# Patient Record
Sex: MALE | Race: BLACK OR AFRICAN AMERICAN | NOT HISPANIC OR LATINO | Employment: UNEMPLOYED
[De-identification: names, ages, dates, MRNs, and addresses within clinical notes are randomized per-mention and may not be internally consistent; named-entity substitution may affect disease eponyms.]

---

## 2019-01-09 ENCOUNTER — HOSPITAL (OUTPATIENT)
Dept: OTHER | Age: 49
End: 2019-01-09

## 2019-01-09 ENCOUNTER — HOSPITAL (OUTPATIENT)
Dept: OTHER | Age: 49
End: 2019-01-09
Attending: INTERNAL MEDICINE

## 2019-01-09 ENCOUNTER — DIAGNOSTIC TRANS (OUTPATIENT)
Dept: OTHER | Age: 49
End: 2019-01-09

## 2019-01-09 LAB
ANALYZER ANC (IANC): ABNORMAL
ANION GAP SERPL CALC-SCNC: 15 MMOL/L (ref 10–20)
APTT PPP: 27 SECONDS (ref 22–32)
APTT PPP: NORMAL S
BASOPHILS # BLD: 0 THOUSAND/MCL (ref 0–0.3)
BASOPHILS NFR BLD: 0 %
BUN SERPL-MCNC: 16 MG/DL (ref 6–20)
BUN/CREAT SERPL: 21 (ref 7–25)
CALCIUM SERPL-MCNC: 8.7 MG/DL (ref 8.4–10.2)
CHLORIDE: 99 MMOL/L (ref 98–107)
CO2 SERPL-SCNC: 26 MMOL/L (ref 21–32)
CREAT SERPL-MCNC: 0.78 MG/DL (ref 0.67–1.17)
DIFFERENTIAL METHOD BLD: ABNORMAL
EOSINOPHIL # BLD: 0 THOUSAND/MCL (ref 0.1–0.5)
EOSINOPHIL NFR BLD: 0 %
ERYTHROCYTE [DISTWIDTH] IN BLOOD: 12.1 % (ref 11–15)
GLUCOSE SERPL-MCNC: 138 MG/DL (ref 65–99)
HEMATOCRIT: 39.4 % (ref 39–51)
HGB BLD-MCNC: 14 GM/DL (ref 13–17)
IMM GRANULOCYTES # BLD AUTO: 0.1 THOUSAND/MCL (ref 0–0.2)
IMM GRANULOCYTES NFR BLD: 1 %
INR PPP: 1.1
LYMPHOCYTES # BLD: 1.4 THOUSAND/MCL (ref 1–4.8)
LYMPHOCYTES NFR BLD: 13 %
MCH RBC QN AUTO: 30 PG (ref 26–34)
MCHC RBC AUTO-ENTMCNC: 35.5 GM/DL (ref 32–36.5)
MCV RBC AUTO: 84.5 FL (ref 78–100)
MONOCYTES # BLD: 0.5 THOUSAND/MCL (ref 0.3–0.9)
MONOCYTES NFR BLD: 5 %
NEUTROPHILS # BLD: 8.5 THOUSAND/MCL (ref 1.8–7.7)
NEUTROPHILS NFR BLD: 81 %
NEUTS SEG NFR BLD: ABNORMAL %
NRBC (NRBCRE): 0 /100 WBC
PLATELET # BLD: 285 THOUSAND/MCL (ref 140–450)
POTASSIUM SERPL-SCNC: 3.6 MMOL/L (ref 3.4–5.1)
PROTHROMBIN TIME: 10.8 SECONDS (ref 9.7–11.8)
PROTHROMBIN TIME: NORMAL
RBC # BLD: 4.66 MILLION/MCL (ref 4.5–5.9)
SODIUM SERPL-SCNC: 136 MMOL/L (ref 135–145)
TROPONIN I SERPL HS-MCNC: 0.67 NG/ML
WBC # BLD: 10.5 THOUSAND/MCL (ref 4.2–11)

## 2019-01-10 LAB
CREAT SERPL-MCNC: 0.94 MG/DL (ref 0.67–1.17)
POTASSIUM SERPL-SCNC: 4.5 MMOL/L (ref 3.4–5.1)
TROPONIN I SERPL HS-MCNC: 27.71 NG/ML

## 2019-01-13 PROBLEM — Z09 HOSPITAL DISCHARGE FOLLOW-UP: Status: ACTIVE | Noted: 2019-01-13

## 2019-01-14 PROBLEM — E78.2 MIXED HYPERLIPIDEMIA: Status: ACTIVE | Noted: 2019-01-14

## 2019-01-14 PROBLEM — Z95.5 S/P DRUG ELUTING CORONARY STENT PLACEMENT: Status: ACTIVE | Noted: 2019-01-14

## 2019-01-14 PROBLEM — I10 ESSENTIAL HYPERTENSION: Status: ACTIVE | Noted: 2019-01-14

## 2019-01-14 PROBLEM — I25.10 CORONARY ARTERY DISEASE INVOLVING NATIVE CORONARY ARTERY OF NATIVE HEART WITHOUT ANGINA PECTORIS: Status: ACTIVE | Noted: 2019-01-14

## 2019-01-23 ENCOUNTER — HOSPITAL (OUTPATIENT)
Dept: OTHER | Age: 49
End: 2019-01-23
Attending: INTERNAL MEDICINE

## 2019-02-01 ENCOUNTER — HOSPITAL (OUTPATIENT)
Dept: OTHER | Age: 49
End: 2019-02-01
Attending: INTERNAL MEDICINE

## 2019-03-01 ENCOUNTER — HOSPITAL (OUTPATIENT)
Dept: OTHER | Age: 49
End: 2019-03-01
Attending: INTERNAL MEDICINE

## 2019-04-01 ENCOUNTER — HOSPITAL (OUTPATIENT)
Dept: OTHER | Age: 49
End: 2019-04-01
Attending: INTERNAL MEDICINE

## 2019-05-01 ENCOUNTER — HOSPITAL (OUTPATIENT)
Dept: OTHER | Age: 49
End: 2019-05-01
Attending: INTERNAL MEDICINE

## 2019-06-01 ENCOUNTER — HOSPITAL (OUTPATIENT)
Dept: OTHER | Age: 49
End: 2019-06-01
Attending: INTERNAL MEDICINE

## 2021-12-02 PROBLEM — R53.83 FATIGUE, UNSPECIFIED TYPE: Status: ACTIVE | Noted: 2021-12-02

## 2021-12-02 PROBLEM — R94.31 ABNORMAL EKG: Status: ACTIVE | Noted: 2021-12-02

## 2022-09-26 PROCEDURE — 80053 COMPREHEN METABOLIC PANEL: CPT | Performed by: EMERGENCY MEDICINE

## 2022-09-26 PROCEDURE — 99285 EMERGENCY DEPT VISIT HI MDM: CPT

## 2022-09-26 PROCEDURE — 85379 FIBRIN DEGRADATION QUANT: CPT | Performed by: EMERGENCY MEDICINE

## 2022-09-26 PROCEDURE — 84484 ASSAY OF TROPONIN QUANT: CPT | Performed by: EMERGENCY MEDICINE

## 2022-09-26 PROCEDURE — 36415 COLL VENOUS BLD VENIPUNCTURE: CPT

## 2022-09-26 PROCEDURE — 85730 THROMBOPLASTIN TIME PARTIAL: CPT | Performed by: EMERGENCY MEDICINE

## 2022-09-26 PROCEDURE — 93005 ELECTROCARDIOGRAM TRACING: CPT | Performed by: EMERGENCY MEDICINE

## 2022-09-26 PROCEDURE — 85025 COMPLETE CBC W/AUTO DIFF WBC: CPT | Performed by: EMERGENCY MEDICINE

## 2022-09-26 PROCEDURE — 85610 PROTHROMBIN TIME: CPT | Performed by: EMERGENCY MEDICINE

## 2022-09-26 ASSESSMENT — PAIN SCALES - GENERAL: PAINLEVEL_OUTOF10: 3

## 2022-09-27 ENCOUNTER — HOSPITAL ENCOUNTER (INPATIENT)
Age: 52
LOS: 2 days | Discharge: HOME | DRG: 282 | End: 2022-09-29
Attending: EMERGENCY MEDICINE | Admitting: HOSPITALIST

## 2022-09-27 ENCOUNTER — APPOINTMENT (OUTPATIENT)
Dept: GENERAL RADIOLOGY | Age: 52
DRG: 282 | End: 2022-09-27
Attending: EMERGENCY MEDICINE

## 2022-09-27 DIAGNOSIS — I21.4 NSTEMI (NON-ST ELEVATED MYOCARDIAL INFARCTION) (CMD): ICD-10-CM

## 2022-09-27 DIAGNOSIS — I20.0 UNSTABLE ANGINA (CMD): Primary | ICD-10-CM

## 2022-09-27 DIAGNOSIS — R79.89 ELEVATED TROPONIN I LEVEL: ICD-10-CM

## 2022-09-27 LAB
ALBUMIN SERPL-MCNC: 3.8 G/DL (ref 3.6–5.1)
ALBUMIN/GLOB SERPL: 0.9 {RATIO} (ref 1–2.4)
ALP SERPL-CCNC: 69 UNITS/L (ref 45–117)
ALT SERPL-CCNC: 30 UNITS/L
ANION GAP SERPL CALC-SCNC: 9 MMOL/L (ref 7–19)
APTT PPP: 24 SEC (ref 22–30)
APTT PPP: 40 SEC (ref 22–30)
APTT PPP: 44 SEC (ref 22–30)
APTT PPP: 47 SEC (ref 22–30)
AST SERPL-CCNC: 24 UNITS/L
ATRIAL RATE (BPM): 83
ATRIAL RATE (BPM): 83
BASOPHILS # BLD: 0 K/MCL (ref 0–0.3)
BASOPHILS NFR BLD: 0 %
BILIRUB SERPL-MCNC: 0.7 MG/DL (ref 0.2–1)
BUN SERPL-MCNC: 17 MG/DL (ref 6–20)
BUN/CREAT SERPL: 22 (ref 7–25)
CALCIUM SERPL-MCNC: 8.6 MG/DL (ref 8.4–10.2)
CHLORIDE SERPL-SCNC: 101 MMOL/L (ref 97–110)
CHOLEST SERPL-MCNC: 121 MG/DL
CHOLEST/HDLC SERPL: 4.8 {RATIO}
CO2 SERPL-SCNC: 28 MMOL/L (ref 21–32)
CREAT SERPL-MCNC: 0.78 MG/DL (ref 0.67–1.17)
D DIMER PPP FEU-MCNC: 0.48 MG/L (FEU)
DEPRECATED RDW RBC: 36.8 FL (ref 39–50)
DEPRECATED RDW RBC: 37.1 FL (ref 39–50)
DEPRECATED RDW RBC: 37.2 FL (ref 39–50)
EOSINOPHIL # BLD: 0 K/MCL (ref 0–0.5)
EOSINOPHIL NFR BLD: 0 %
ERYTHROCYTE [DISTWIDTH] IN BLOOD: 12.2 % (ref 11–15)
FASTING DURATION TIME PATIENT: ABNORMAL H
FASTING DURATION TIME PATIENT: ABNORMAL H
GFR SERPLBLD BASED ON 1.73 SQ M-ARVRAT: >90 ML/MIN
GLOBULIN SER-MCNC: 4.3 G/DL (ref 2–4)
GLUCOSE SERPL-MCNC: 149 MG/DL (ref 70–99)
HBA1C MFR BLD: 5.4 % (ref 4.5–5.6)
HCT VFR BLD CALC: 40.7 % (ref 39–51)
HCT VFR BLD CALC: 43.6 % (ref 39–51)
HCT VFR BLD CALC: 44.4 % (ref 39–51)
HDLC SERPL-MCNC: 25 MG/DL
HGB BLD-MCNC: 13.7 G/DL (ref 13–17)
HGB BLD-MCNC: 14.8 G/DL (ref 13–17)
HGB BLD-MCNC: 15.3 G/DL (ref 13–17)
IMM GRANULOCYTES # BLD AUTO: 0 K/MCL (ref 0–0.2)
IMM GRANULOCYTES # BLD: 1 %
INR PPP: 1.1
LDLC SERPL CALC-MCNC: 68 MG/DL
LYMPHOCYTES # BLD: 1.2 K/MCL (ref 1–4)
LYMPHOCYTES NFR BLD: 15 %
MCH RBC QN AUTO: 28.1 PG (ref 26–34)
MCH RBC QN AUTO: 28.2 PG (ref 26–34)
MCH RBC QN AUTO: 28.6 PG (ref 26–34)
MCHC RBC AUTO-ENTMCNC: 33.7 G/DL (ref 32–36.5)
MCHC RBC AUTO-ENTMCNC: 33.9 G/DL (ref 32–36.5)
MCHC RBC AUTO-ENTMCNC: 34.5 G/DL (ref 32–36.5)
MCV RBC AUTO: 83 FL (ref 78–100)
MCV RBC AUTO: 83.2 FL (ref 78–100)
MCV RBC AUTO: 83.4 FL (ref 78–100)
MONOCYTES # BLD: 0.5 K/MCL (ref 0.3–0.9)
MONOCYTES NFR BLD: 6 %
NEUTROPHILS # BLD: 6.4 K/MCL (ref 1.8–7.7)
NEUTROPHILS NFR BLD: 78 %
NONHDLC SERPL-MCNC: 96 MG/DL
NRBC BLD MANUAL-RTO: 0 /100 WBC
P AXIS (DEGREES): 23
P AXIS (DEGREES): 40
PLATELET # BLD AUTO: 211 K/MCL (ref 140–450)
PLATELET # BLD AUTO: 214 K/MCL (ref 140–450)
PLATELET # BLD AUTO: 227 K/MCL (ref 140–450)
POTASSIUM SERPL-SCNC: 3.3 MMOL/L (ref 3.4–5.1)
PR-INTERVAL (MSEC): 146
PR-INTERVAL (MSEC): 146
PROT SERPL-MCNC: 8.1 G/DL (ref 6.4–8.2)
PROTHROMBIN TIME: 11.2 SEC (ref 9.7–11.8)
QRS-INTERVAL (MSEC): 82
QRS-INTERVAL (MSEC): 90
QT-INTERVAL (MSEC): 382
QT-INTERVAL (MSEC): 390
QTC: 449
QTC: 458
R AXIS (DEGREES): 14
R AXIS (DEGREES): 38
RAINBOW EXTRA TUBES HOLD SPECIMEN: NORMAL
RBC # BLD: 4.88 MIL/MCL (ref 4.5–5.9)
RBC # BLD: 5.24 MIL/MCL (ref 4.5–5.9)
RBC # BLD: 5.35 MIL/MCL (ref 4.5–5.9)
REPORT TEXT: NORMAL
REPORT TEXT: NORMAL
SARS-COV-2 N GENE CT SPEC QN NAA N2: 40.6
SARS-COV-2 RNA RESP QL NAA+PROBE: DETECTED
SERVICE CMNT-IMP: ABNORMAL
SODIUM SERPL-SCNC: 135 MMOL/L (ref 135–145)
T AXIS (DEGREES): 24
T AXIS (DEGREES): 39
TRIGL SERPL-MCNC: 141 MG/DL
TROPONIN I SERPL DL<=0.01 NG/ML-MCNC: 3672 NG/L
TROPONIN I SERPL DL<=0.01 NG/ML-MCNC: 62 NG/L
VENTRICULAR RATE EKG/MIN (BPM): 83
VENTRICULAR RATE EKG/MIN (BPM): 83
WBC # BLD: 5.6 K/MCL (ref 4.2–11)
WBC # BLD: 7.1 K/MCL (ref 4.2–11)
WBC # BLD: 8.2 K/MCL (ref 4.2–11)

## 2022-09-27 PROCEDURE — 10002803 HB RX 637: Performed by: INTERNAL MEDICINE

## 2022-09-27 PROCEDURE — 10004651 HB RX, NO CHARGE ITEM: Performed by: EMERGENCY MEDICINE

## 2022-09-27 PROCEDURE — 36415 COLL VENOUS BLD VENIPUNCTURE: CPT | Performed by: EMERGENCY MEDICINE

## 2022-09-27 PROCEDURE — 83036 HEMOGLOBIN GLYCOSYLATED A1C: CPT | Performed by: HOSPITALIST

## 2022-09-27 PROCEDURE — 10004651 HB RX, NO CHARGE ITEM: Performed by: HOSPITALIST

## 2022-09-27 PROCEDURE — 85730 THROMBOPLASTIN TIME PARTIAL: CPT | Performed by: EMERGENCY MEDICINE

## 2022-09-27 PROCEDURE — 10002803 HB RX 637: Performed by: EMERGENCY MEDICINE

## 2022-09-27 PROCEDURE — 80061 LIPID PANEL: CPT | Performed by: HOSPITALIST

## 2022-09-27 PROCEDURE — 85027 COMPLETE CBC AUTOMATED: CPT | Performed by: EMERGENCY MEDICINE

## 2022-09-27 PROCEDURE — 87635 SARS-COV-2 COVID-19 AMP PRB: CPT | Performed by: INTERNAL MEDICINE

## 2022-09-27 PROCEDURE — 10003585 HB ROOM CHARGE INTERMEDIATE CARE

## 2022-09-27 PROCEDURE — 93005 ELECTROCARDIOGRAM TRACING: CPT | Performed by: EMERGENCY MEDICINE

## 2022-09-27 PROCEDURE — 71045 X-RAY EXAM CHEST 1 VIEW: CPT

## 2022-09-27 PROCEDURE — 85730 THROMBOPLASTIN TIME PARTIAL: CPT | Performed by: INTERNAL MEDICINE

## 2022-09-27 PROCEDURE — 84484 ASSAY OF TROPONIN QUANT: CPT | Performed by: EMERGENCY MEDICINE

## 2022-09-27 PROCEDURE — 10002800 HB RX 250 W HCPCS: Performed by: EMERGENCY MEDICINE

## 2022-09-27 RX ORDER — AMOXICILLIN 250 MG
2 CAPSULE ORAL DAILY PRN
Status: DISCONTINUED | OUTPATIENT
Start: 2022-09-27 | End: 2022-09-29 | Stop reason: HOSPADM

## 2022-09-27 RX ORDER — AMLODIPINE BESYLATE 5 MG/1
5 TABLET ORAL DAILY
COMMUNITY

## 2022-09-27 RX ORDER — CLONIDINE HYDROCHLORIDE 0.1 MG/1
0.1 TABLET ORAL EVERY 4 HOURS PRN
Status: ACTIVE | OUTPATIENT
Start: 2022-09-27 | End: 2022-09-28

## 2022-09-27 RX ORDER — POTASSIUM CHLORIDE 20 MEQ/1
40 TABLET, EXTENDED RELEASE ORAL ONCE
Status: COMPLETED | OUTPATIENT
Start: 2022-09-27 | End: 2022-09-27

## 2022-09-27 RX ORDER — ACETAMINOPHEN 325 MG/1
650 TABLET ORAL EVERY 4 HOURS PRN
Status: DISCONTINUED | OUTPATIENT
Start: 2022-09-27 | End: 2022-09-29 | Stop reason: HOSPADM

## 2022-09-27 RX ORDER — AMLODIPINE BESYLATE 5 MG/1
5 TABLET ORAL DAILY
Status: DISCONTINUED | OUTPATIENT
Start: 2022-09-27 | End: 2022-09-29 | Stop reason: HOSPADM

## 2022-09-27 RX ORDER — BISACODYL 10 MG
10 SUPPOSITORY, RECTAL RECTAL DAILY PRN
Status: DISCONTINUED | OUTPATIENT
Start: 2022-09-27 | End: 2022-09-29 | Stop reason: HOSPADM

## 2022-09-27 RX ORDER — HYDRALAZINE HYDROCHLORIDE 20 MG/ML
10 INJECTION INTRAMUSCULAR; INTRAVENOUS EVERY 4 HOURS PRN
Status: ACTIVE | OUTPATIENT
Start: 2022-09-27 | End: 2022-09-28

## 2022-09-27 RX ORDER — ASPIRIN 81 MG/1
324 TABLET, CHEWABLE ORAL ONCE
Status: COMPLETED | OUTPATIENT
Start: 2022-09-27 | End: 2022-09-27

## 2022-09-27 RX ORDER — HEPARIN SODIUM 10000 [USP'U]/100ML
1-30 INJECTION, SOLUTION INTRAVENOUS CONTINUOUS
Status: DISCONTINUED | OUTPATIENT
Start: 2022-09-27 | End: 2022-09-29 | Stop reason: HOSPADM

## 2022-09-27 RX ORDER — ARIPIPRAZOLE 5 MG/1
5 TABLET ORAL DAILY
Status: DISCONTINUED | OUTPATIENT
Start: 2022-09-27 | End: 2022-09-29 | Stop reason: HOSPADM

## 2022-09-27 RX ORDER — ASPIRIN 81 MG/1
81 TABLET, CHEWABLE ORAL DAILY
Status: DISCONTINUED | OUTPATIENT
Start: 2022-09-27 | End: 2022-09-29 | Stop reason: HOSPADM

## 2022-09-27 RX ORDER — HEPARIN SODIUM 1000 [USP'U]/ML
4000 INJECTION, SOLUTION INTRAVENOUS; SUBCUTANEOUS PRN
Status: DISCONTINUED | OUTPATIENT
Start: 2022-09-27 | End: 2022-09-29 | Stop reason: HOSPADM

## 2022-09-27 RX ORDER — POLYETHYLENE GLYCOL 3350 17 G/17G
17 POWDER, FOR SOLUTION ORAL DAILY PRN
Status: DISCONTINUED | OUTPATIENT
Start: 2022-09-27 | End: 2022-09-29 | Stop reason: HOSPADM

## 2022-09-27 RX ORDER — HEPARIN SODIUM 1000 [USP'U]/ML
4000 INJECTION, SOLUTION INTRAVENOUS; SUBCUTANEOUS ONCE
Status: COMPLETED | OUTPATIENT
Start: 2022-09-27 | End: 2022-09-27

## 2022-09-27 RX ORDER — ARIPIPRAZOLE 5 MG/1
5 TABLET ORAL DAILY
COMMUNITY

## 2022-09-27 RX ORDER — HEPARIN SODIUM 1000 [USP'U]/ML
2000 INJECTION, SOLUTION INTRAVENOUS; SUBCUTANEOUS PRN
Status: DISCONTINUED | OUTPATIENT
Start: 2022-09-27 | End: 2022-09-29 | Stop reason: HOSPADM

## 2022-09-27 RX ORDER — SODIUM CHLORIDE 9 MG/ML
INJECTION, SOLUTION INTRAVENOUS CONTINUOUS
Status: DISCONTINUED | OUTPATIENT
Start: 2022-09-27 | End: 2022-09-29 | Stop reason: HOSPADM

## 2022-09-27 RX ORDER — ROSUVASTATIN CALCIUM 20 MG/1
20 TABLET, COATED ORAL NIGHTLY
COMMUNITY

## 2022-09-27 RX ORDER — ONDANSETRON 2 MG/ML
4 INJECTION INTRAMUSCULAR; INTRAVENOUS 2 TIMES DAILY PRN
Status: DISCONTINUED | OUTPATIENT
Start: 2022-09-27 | End: 2022-09-29 | Stop reason: HOSPADM

## 2022-09-27 RX ORDER — 0.9 % SODIUM CHLORIDE 0.9 %
2 VIAL (ML) INJECTION EVERY 12 HOURS SCHEDULED
Status: DISCONTINUED | OUTPATIENT
Start: 2022-09-27 | End: 2022-09-29 | Stop reason: HOSPADM

## 2022-09-27 RX ORDER — ATORVASTATIN CALCIUM 20 MG/1
20 TABLET, FILM COATED ORAL NIGHTLY
Status: DISCONTINUED | OUTPATIENT
Start: 2022-09-27 | End: 2022-09-29 | Stop reason: HOSPADM

## 2022-09-27 RX ADMIN — HEPARIN SODIUM 14 UNITS/KG/HR: 10000 INJECTION, SOLUTION INTRAVENOUS at 09:57

## 2022-09-27 RX ADMIN — HEPARIN SODIUM 2000 UNITS: 1000 INJECTION, SOLUTION INTRAVENOUS; SUBCUTANEOUS at 23:55

## 2022-09-27 RX ADMIN — HEPARIN SODIUM 2000 UNITS: 1000 INJECTION, SOLUTION INTRAVENOUS; SUBCUTANEOUS at 09:57

## 2022-09-27 RX ADMIN — POTASSIUM CHLORIDE 40 MEQ: 1500 TABLET, EXTENDED RELEASE ORAL at 00:53

## 2022-09-27 RX ADMIN — ASPIRIN 81 MG CHEWABLE TABLET 81 MG: 81 TABLET CHEWABLE at 09:39

## 2022-09-27 RX ADMIN — HEPARIN SODIUM 4000 UNITS: 1000 INJECTION, SOLUTION INTRAVENOUS; SUBCUTANEOUS at 02:54

## 2022-09-27 RX ADMIN — METOPROLOL TARTRATE 25 MG: 25 TABLET, FILM COATED ORAL at 20:38

## 2022-09-27 RX ADMIN — ATORVASTATIN CALCIUM 20 MG: 20 TABLET, FILM COATED ORAL at 20:38

## 2022-09-27 RX ADMIN — HEPARIN SODIUM 16 UNITS/KG/HR: 10000 INJECTION, SOLUTION INTRAVENOUS at 23:53

## 2022-09-27 RX ADMIN — ASPIRIN 81 MG CHEWABLE TABLET 324 MG: 81 TABLET CHEWABLE at 00:40

## 2022-09-27 RX ADMIN — HEPARIN SODIUM 12 UNITS/KG/HR: 10000 INJECTION, SOLUTION INTRAVENOUS at 02:55

## 2022-09-27 RX ADMIN — ARIPIPRAZOLE 5 MG: 5 TABLET ORAL at 15:47

## 2022-09-27 RX ADMIN — AMLODIPINE BESYLATE 5 MG: 5 TABLET ORAL at 15:47

## 2022-09-27 ASSESSMENT — LIFESTYLE VARIABLES
ARE YOU BLIND OR DO YOU HAVE SERIOUS DIFFICULTY SEEING, EVEN WHEN WEARING GLASSES: NO
LAST DRINK YOU HAD: DENIES INTAKE
AUDIT-C TOTAL SCORE: 0
SMOKING_YEARS: NO
IS PATIENT ABLE TO COMPLETE ASSESSMENT AT THIS TIME: YES
HISTORY OF PROBLEMS WHEN YOU STOP DRINKING ALCOHOL: NO
ARE YOU DEAF OR DO YOU HAVE SERIOUS DIFFICULTY  HEARING: NO
SHORT OF BREATH OR FATIGUE WITH ADLS: NO
HAVE YOU BEEN EATING POORLY BECAUSE OF A DECREASED APPETITE: 0
ADL NEEDS ASSIST: NO
HOW OFTEN DO YOU HAVE A DRINK CONTAINING ALCOHOL: NEVER
CHRONIC/CANCER PAIN PRESENT: NO
RECENT DECLINE IN ADLS: NO
HOW OFTEN DO YOU HAVE 6 OR MORE DRINKS ON ONE OCCASION: NEVER
HOW MANY STANDARD DRINKS CONTAINING ALCOHOL DO YOU HAVE ON A TYPICAL DAY: 0,1 OR 2
ALCOHOL_USE_STATUS: NO OR LOW RISK WITH VALIDATED TOOL
RECENTLY LOST WEIGHT WITHOUT TRYING: 0
ADL BEFORE ADMISSION: INDEPENDENT

## 2022-09-27 ASSESSMENT — PAIN SCALES - GENERAL
PAINLEVEL_OUTOF10: 0

## 2022-09-27 ASSESSMENT — ACTIVITIES OF DAILY LIVING (ADL): ADL_SCORE: 12

## 2022-09-27 ASSESSMENT — HEART SCORE
TROPONIN: EQUAL OR LESS THAN NORMAL LIMIT
RISK FACTORS: EQUAL OR GREATER  THAN 3 RISK FACTORS OR HISTORY OF ATHEROSCLEROTIC DISEASE
HEART SCORE: 3
EKG: NORMAL
HISTORY: SLIGHTLY SUSPICIOUS
AGE: GREATER THAN 45 TO LESS THAN 65

## 2022-09-27 ASSESSMENT — COGNITIVE AND FUNCTIONAL STATUS - GENERAL
DO YOU HAVE DIFFICULTY DRESSING OR BATHING: NO
BECAUSE OF A PHYSICAL, MENTAL, OR EMOTIONAL CONDITION, DO YOU HAVE DIFFICULTY DOING ERRANDS ALONE: NO
DO YOU HAVE SERIOUS DIFFICULTY WALKING OR CLIMBING STAIRS: NO
BECAUSE OF A PHYSICAL, MENTAL, OR EMOTIONAL CONDITION, DO YOU HAVE SERIOUS DIFFICULTY CONCENTRATING, REMEMBERING OR MAKING DECISIONS: NO

## 2022-09-28 LAB
ANION GAP SERPL CALC-SCNC: 10 MMOL/L (ref 7–19)
APTT PPP: 63 SEC (ref 22–30)
APTT PPP: 63 SEC (ref 22–30)
BUN SERPL-MCNC: 13 MG/DL (ref 6–20)
BUN/CREAT SERPL: 20 (ref 7–25)
CALCIUM SERPL-MCNC: 9 MG/DL (ref 8.4–10.2)
CHLORIDE SERPL-SCNC: 103 MMOL/L (ref 97–110)
CO2 SERPL-SCNC: 27 MMOL/L (ref 21–32)
CREAT SERPL-MCNC: 0.66 MG/DL (ref 0.67–1.17)
DEPRECATED RDW RBC: 37.8 FL (ref 39–50)
ERYTHROCYTE [DISTWIDTH] IN BLOOD: 12.4 % (ref 11–15)
FASTING DURATION TIME PATIENT: ABNORMAL H
GFR SERPLBLD BASED ON 1.73 SQ M-ARVRAT: >90 ML/MIN
GLUCOSE SERPL-MCNC: 111 MG/DL (ref 70–99)
HCT VFR BLD CALC: 46.5 % (ref 39–51)
HGB BLD-MCNC: 15.9 G/DL (ref 13–17)
MCH RBC QN AUTO: 28.4 PG (ref 26–34)
MCHC RBC AUTO-ENTMCNC: 34.2 G/DL (ref 32–36.5)
MCV RBC AUTO: 83.2 FL (ref 78–100)
NRBC BLD MANUAL-RTO: 0 /100 WBC
PLATELET # BLD AUTO: 234 K/MCL (ref 140–450)
POTASSIUM SERPL-SCNC: 3.7 MMOL/L (ref 3.4–5.1)
RBC # BLD: 5.59 MIL/MCL (ref 4.5–5.9)
SODIUM SERPL-SCNC: 136 MMOL/L (ref 135–145)
WBC # BLD: 5.4 K/MCL (ref 4.2–11)

## 2022-09-28 PROCEDURE — C1887 CATHETER, GUIDING: HCPCS | Performed by: INTERNAL MEDICINE

## 2022-09-28 PROCEDURE — 10006023 HB SUPPLY 272: Performed by: INTERNAL MEDICINE

## 2022-09-28 PROCEDURE — 10002803 HB RX 637: Performed by: INTERNAL MEDICINE

## 2022-09-28 PROCEDURE — 4A033BC MEASUREMENT OF ARTERIAL PRESSURE, CORONARY, PERCUTANEOUS APPROACH: ICD-10-PCS | Performed by: INTERNAL MEDICINE

## 2022-09-28 PROCEDURE — 10002801 HB RX 250 W/O HCPCS: Performed by: INTERNAL MEDICINE

## 2022-09-28 PROCEDURE — 36415 COLL VENOUS BLD VENIPUNCTURE: CPT | Performed by: EMERGENCY MEDICINE

## 2022-09-28 PROCEDURE — C1769 GUIDE WIRE: HCPCS | Performed by: INTERNAL MEDICINE

## 2022-09-28 PROCEDURE — 10002807 HB RX 258: Performed by: PHYSICIAN ASSISTANT

## 2022-09-28 PROCEDURE — C1894 INTRO/SHEATH, NON-LASER: HCPCS | Performed by: INTERNAL MEDICINE

## 2022-09-28 PROCEDURE — 10002800 HB RX 250 W HCPCS: Performed by: INTERNAL MEDICINE

## 2022-09-28 PROCEDURE — 93571 IV DOP VEL&/PRESS C FLO 1ST: CPT | Performed by: INTERNAL MEDICINE

## 2022-09-28 PROCEDURE — 85027 COMPLETE CBC AUTOMATED: CPT | Performed by: EMERGENCY MEDICINE

## 2022-09-28 PROCEDURE — 80048 BASIC METABOLIC PNL TOTAL CA: CPT | Performed by: HOSPITALIST

## 2022-09-28 PROCEDURE — B2111ZZ FLUOROSCOPY OF MULTIPLE CORONARY ARTERIES USING LOW OSMOLAR CONTRAST: ICD-10-PCS | Performed by: INTERNAL MEDICINE

## 2022-09-28 PROCEDURE — 99152 MOD SED SAME PHYS/QHP 5/>YRS: CPT | Performed by: INTERNAL MEDICINE

## 2022-09-28 PROCEDURE — 10003585 HB ROOM CHARGE INTERMEDIATE CARE

## 2022-09-28 PROCEDURE — 85730 THROMBOPLASTIN TIME PARTIAL: CPT | Performed by: EMERGENCY MEDICINE

## 2022-09-28 PROCEDURE — 93458 L HRT ARTERY/VENTRICLE ANGIO: CPT | Performed by: INTERNAL MEDICINE

## 2022-09-28 PROCEDURE — 10004651 HB RX, NO CHARGE ITEM: Performed by: HOSPITALIST

## 2022-09-28 PROCEDURE — B2151ZZ FLUOROSCOPY OF LEFT HEART USING LOW OSMOLAR CONTRAST: ICD-10-PCS | Performed by: INTERNAL MEDICINE

## 2022-09-28 PROCEDURE — 4A023N7 MEASUREMENT OF CARDIAC SAMPLING AND PRESSURE, LEFT HEART, PERCUTANEOUS APPROACH: ICD-10-PCS | Performed by: INTERNAL MEDICINE

## 2022-09-28 PROCEDURE — 10002805 HB CONTRAST AGENT: Performed by: INTERNAL MEDICINE

## 2022-09-28 PROCEDURE — 99153 MOD SED SAME PHYS/QHP EA: CPT | Performed by: INTERNAL MEDICINE

## 2022-09-28 PROCEDURE — 85730 THROMBOPLASTIN TIME PARTIAL: CPT | Performed by: INTERNAL MEDICINE

## 2022-09-28 RX ORDER — MIDAZOLAM HYDROCHLORIDE 1 MG/ML
INJECTION, SOLUTION INTRAMUSCULAR; INTRAVENOUS PRN
Status: DISCONTINUED | OUTPATIENT
Start: 2022-09-28 | End: 2022-09-28 | Stop reason: HOSPADM

## 2022-09-28 RX ORDER — VERAPAMIL HYDROCHLORIDE 2.5 MG/ML
INJECTION, SOLUTION INTRAVENOUS PRN
Status: DISCONTINUED | OUTPATIENT
Start: 2022-09-28 | End: 2022-09-28 | Stop reason: HOSPADM

## 2022-09-28 RX ORDER — POTASSIUM CHLORIDE 20 MEQ/1
40 TABLET, EXTENDED RELEASE ORAL ONCE
Status: COMPLETED | OUTPATIENT
Start: 2022-09-28 | End: 2022-09-28

## 2022-09-28 RX ORDER — HEPARIN SODIUM 1000 [USP'U]/ML
INJECTION, SOLUTION INTRAVENOUS; SUBCUTANEOUS PRN
Status: DISCONTINUED | OUTPATIENT
Start: 2022-09-28 | End: 2022-09-28 | Stop reason: HOSPADM

## 2022-09-28 RX ORDER — LIDOCAINE HYDROCHLORIDE 20 MG/ML
INJECTION, SOLUTION EPIDURAL; INFILTRATION; INTRACAUDAL; PERINEURAL PRN
Status: DISCONTINUED | OUTPATIENT
Start: 2022-09-28 | End: 2022-09-28 | Stop reason: HOSPADM

## 2022-09-28 RX ADMIN — POTASSIUM CHLORIDE 40 MEQ: 1500 TABLET, EXTENDED RELEASE ORAL at 08:41

## 2022-09-28 RX ADMIN — ASPIRIN 81 MG CHEWABLE TABLET 81 MG: 81 TABLET CHEWABLE at 08:41

## 2022-09-28 RX ADMIN — METOPROLOL TARTRATE 25 MG: 25 TABLET, FILM COATED ORAL at 21:18

## 2022-09-28 RX ADMIN — ARIPIPRAZOLE 5 MG: 5 TABLET ORAL at 08:41

## 2022-09-28 RX ADMIN — ATORVASTATIN CALCIUM 20 MG: 20 TABLET, FILM COATED ORAL at 21:18

## 2022-09-28 RX ADMIN — SODIUM CHLORIDE 2 ML: 9 INJECTION, SOLUTION INTRAMUSCULAR; INTRAVENOUS; SUBCUTANEOUS at 20:00

## 2022-09-28 RX ADMIN — SODIUM CHLORIDE: 9 INJECTION, SOLUTION INTRAVENOUS at 13:53

## 2022-09-28 ASSESSMENT — PAIN SCALES - GENERAL
PAINLEVEL_OUTOF10: 0

## 2022-09-28 ASSESSMENT — COGNITIVE AND FUNCTIONAL STATUS - GENERAL
DO YOU HAVE SERIOUS DIFFICULTY WALKING OR CLIMBING STAIRS: NO
BECAUSE OF A PHYSICAL, MENTAL, OR EMOTIONAL CONDITION, DO YOU HAVE DIFFICULTY DOING ERRANDS ALONE: NO
BECAUSE OF A PHYSICAL, MENTAL, OR EMOTIONAL CONDITION, DO YOU HAVE SERIOUS DIFFICULTY CONCENTRATING, REMEMBERING OR MAKING DECISIONS: NO
DO YOU HAVE DIFFICULTY DRESSING OR BATHING: NO

## 2022-09-29 ENCOUNTER — APPOINTMENT (OUTPATIENT)
Dept: CARDIOLOGY | Age: 52
DRG: 282 | End: 2022-09-29
Attending: INTERNAL MEDICINE

## 2022-09-29 VITALS
TEMPERATURE: 98.2 F | BODY MASS INDEX: 27.13 KG/M2 | WEIGHT: 179.01 LBS | DIASTOLIC BLOOD PRESSURE: 77 MMHG | RESPIRATION RATE: 20 BRPM | HEIGHT: 68 IN | HEART RATE: 86 BPM | OXYGEN SATURATION: 98 % | SYSTOLIC BLOOD PRESSURE: 112 MMHG

## 2022-09-29 LAB
AORTIC VALVE AREA: NORMAL
APTT PPP: 26 SEC (ref 22–30)
ASCENDING AORTA (AAD): 3.1
AV MEAN GRADIENT (AVMG): 2
AV MEAN VELOCITY (AVMV): NORMAL
AV PEAK GRADIENT (AVPG): 3
AV PEAK VELOCITY (AVPV): NORMAL
AV STENOSIS SEVERITY TEXT: NORMAL
DEPRECATED RDW RBC: 37.5 FL (ref 39–50)
E WAVE DECELARATION TIME (MDT): 232
ERYTHROCYTE [DISTWIDTH] IN BLOOD: 12.4 % (ref 11–15)
EST RIGHT VENT SYSTOLIC PRESSURE BY TRICUSPID REGURGITATION JET (RVSP): 20.47
HCT VFR BLD CALC: 45.6 % (ref 39–51)
HGB BLD-MCNC: 15.3 G/DL (ref 13–17)
INTERVENTRICULAR SEPTUM IN END DIASTOLE (IVSD): 1
LEFT INTERNAL DIMENSION IN SYSTOLE (LVSD): 3.7
LEFT VENTRICULAR INTERNAL DIMENSION IN DIASTOLE (LVDD): 5.2
LEFT VENTRICULAR POSTERIOR WALL IN END DIASTOLE (LVPW): 1
LV EF: NORMAL %
LVOT 2D (LVOTD): 2.1
MCH RBC QN AUTO: 28.3 PG (ref 26–34)
MCHC RBC AUTO-ENTMCNC: 33.6 G/DL (ref 32–36.5)
MCV RBC AUTO: 84.3 FL (ref 78–100)
MV E TISSUE VEL LAT (MELV): 8.27
MV E TISSUE VEL MED (MESV): 6.42
MV E WAVE VEL/E TISSUE VEL MED(MSR): 8.15
MV PEAK A VELOCITY (MVPAV): 0.54
MV PEAK E VELOCITY (MVPEV): 0.52
NRBC BLD MANUAL-RTO: 0 /100 WBC
PLATELET # BLD AUTO: 247 K/MCL (ref 140–450)
POTASSIUM SERPL-SCNC: 3.6 MMOL/L (ref 3.4–5.1)
RBC # BLD: 5.41 MIL/MCL (ref 4.5–5.9)
TRICUSPID ANNULAR PLANE SYSTOLIC EXCURSION (TAPSE): 1.91
TRICUSPID VALVE PEAK REGURGITATION VELOCITY (TRPV): 2.09
TV ESTIMATED RIGHT ARTERIAL PRESSURE (RAP): 3
WBC # BLD: 5.7 K/MCL (ref 4.2–11)

## 2022-09-29 PROCEDURE — 10002803 HB RX 637: Performed by: INTERNAL MEDICINE

## 2022-09-29 PROCEDURE — 36415 COLL VENOUS BLD VENIPUNCTURE: CPT | Performed by: EMERGENCY MEDICINE

## 2022-09-29 PROCEDURE — 84132 ASSAY OF SERUM POTASSIUM: CPT | Performed by: INTERNAL MEDICINE

## 2022-09-29 PROCEDURE — 93306 TTE W/DOPPLER COMPLETE: CPT

## 2022-09-29 PROCEDURE — 10004651 HB RX, NO CHARGE ITEM: Performed by: HOSPITALIST

## 2022-09-29 PROCEDURE — 85027 COMPLETE CBC AUTOMATED: CPT | Performed by: EMERGENCY MEDICINE

## 2022-09-29 PROCEDURE — 85730 THROMBOPLASTIN TIME PARTIAL: CPT | Performed by: EMERGENCY MEDICINE

## 2022-09-29 RX ORDER — CLOPIDOGREL BISULFATE 75 MG/1
75 TABLET ORAL DAILY
Status: DISCONTINUED | OUTPATIENT
Start: 2022-09-29 | End: 2022-09-29 | Stop reason: HOSPADM

## 2022-09-29 RX ORDER — POTASSIUM CHLORIDE 20 MEQ/1
40 TABLET, EXTENDED RELEASE ORAL ONCE
Status: COMPLETED | OUTPATIENT
Start: 2022-09-29 | End: 2022-09-29

## 2022-09-29 RX ORDER — CLOPIDOGREL BISULFATE 75 MG/1
75 TABLET ORAL DAILY
Qty: 90 TABLET | Refills: 0 | Status: SHIPPED | OUTPATIENT
Start: 2022-09-29

## 2022-09-29 RX ORDER — CLOPIDOGREL BISULFATE 75 MG/1
75 TABLET ORAL DAILY
Qty: 90 TABLET | Refills: 0 | Status: SHIPPED | OUTPATIENT
Start: 2022-09-29 | End: 2022-09-29 | Stop reason: SDUPTHER

## 2022-09-29 RX ADMIN — SODIUM CHLORIDE 2 ML: 9 INJECTION, SOLUTION INTRAMUSCULAR; INTRAVENOUS; SUBCUTANEOUS at 10:02

## 2022-09-29 RX ADMIN — CLOPIDOGREL BISULFATE 75 MG: 75 TABLET, FILM COATED ORAL at 13:51

## 2022-09-29 RX ADMIN — ARIPIPRAZOLE 5 MG: 5 TABLET ORAL at 09:59

## 2022-09-29 RX ADMIN — METOPROLOL TARTRATE 25 MG: 25 TABLET, FILM COATED ORAL at 09:59

## 2022-09-29 RX ADMIN — POTASSIUM CHLORIDE 40 MEQ: 1500 TABLET, EXTENDED RELEASE ORAL at 09:59

## 2022-09-29 RX ADMIN — ASPIRIN 81 MG CHEWABLE TABLET 81 MG: 81 TABLET CHEWABLE at 09:59

## 2022-09-29 RX ADMIN — AMLODIPINE BESYLATE 5 MG: 5 TABLET ORAL at 09:59

## 2022-09-29 ASSESSMENT — PAIN SCALES - GENERAL: PAINLEVEL_OUTOF10: 0

## 2023-09-25 ENCOUNTER — LAB SERVICES (OUTPATIENT)
Dept: LAB | Age: 53
End: 2023-09-25

## 2023-09-25 ENCOUNTER — LAB ENCOUNTER (OUTPATIENT)
Dept: LAB | Facility: HOSPITAL | Age: 53
End: 2023-09-25
Attending: STUDENT IN AN ORGANIZED HEALTH CARE EDUCATION/TRAINING PROGRAM
Payer: COMMERCIAL

## 2023-09-25 ENCOUNTER — HOSPITAL ENCOUNTER (OUTPATIENT)
Dept: ULTRASOUND IMAGING | Age: 53
Discharge: HOME | End: 2023-09-25
Attending: STUDENT IN AN ORGANIZED HEALTH CARE EDUCATION/TRAINING PROGRAM

## 2023-09-25 ENCOUNTER — HOSPITAL ENCOUNTER (OUTPATIENT)
Dept: ULTRASOUND IMAGING | Facility: HOSPITAL | Age: 53
Discharge: HOME | End: 2023-09-25
Attending: STUDENT IN AN ORGANIZED HEALTH CARE EDUCATION/TRAINING PROGRAM
Payer: COMMERCIAL

## 2023-09-25 DIAGNOSIS — E78.2 MIXED HYPERLIPIDEMIA: ICD-10-CM

## 2023-09-25 DIAGNOSIS — I82.491 ACUTE THROMBOEMBOLISM OF PERONEAL VEIN, RIGHT (HCC): ICD-10-CM

## 2023-09-25 DIAGNOSIS — I82.453 ACUTE DEEP VEIN THROMBOSIS (DVT) OF BOTH PERONEAL VEINS (CMD): Primary | ICD-10-CM

## 2023-09-25 DIAGNOSIS — Z00.01 ENCOUNTER FOR GENERAL ADULT MEDICAL EXAMINATION WITH ABNORMAL FINDINGS: ICD-10-CM

## 2023-09-25 DIAGNOSIS — R73.03 BORDERLINE DIABETES: Primary | ICD-10-CM

## 2023-09-25 DIAGNOSIS — I82.453 ACUTE DEEP VEIN THROMBOSIS (DVT) OF BOTH PERONEAL VEINS (CMD): ICD-10-CM

## 2023-09-25 DIAGNOSIS — I82.409 DEEP VENOUS THROMBOSIS OF LOWER EXTREMITY (HCC): ICD-10-CM

## 2023-09-25 DIAGNOSIS — R73.03 BORDERLINE DIABETES: ICD-10-CM

## 2023-09-25 DIAGNOSIS — E03.9 ACQUIRED HYPOTHYROIDISM: ICD-10-CM

## 2023-09-25 LAB
ALBUMIN SERPL-MCNC: 3.5 G/DL (ref 3.4–5)
ALBUMIN/GLOB SERPL: 0.7 {RATIO} (ref 1–2)
ALP LIVER SERPL-CCNC: 91 U/L
ALT SERPL-CCNC: 68 U/L
ANION GAP SERPL CALC-SCNC: 6 MMOL/L (ref 0–18)
AST SERPL-CCNC: 45 U/L (ref 15–37)
BASOPHILS # BLD AUTO: 0.05 X10(3) UL (ref 0–0.2)
BASOPHILS NFR BLD AUTO: 0.6 %
BILIRUB SERPL-MCNC: 0.6 MG/DL (ref 0.1–2)
BUN BLD-MCNC: 13 MG/DL (ref 7–18)
CALCIUM BLD-MCNC: 9.5 MG/DL (ref 8.5–10.1)
CHLORIDE SERPL-SCNC: 100 MMOL/L (ref 98–112)
CHOLEST SERPL-MCNC: 109 MG/DL (ref ?–200)
CO2 SERPL-SCNC: 30 MMOL/L (ref 21–32)
CREAT BLD-MCNC: 0.86 MG/DL
EGFRCR SERPLBLD CKD-EPI 2021: 103 ML/MIN/1.73M2 (ref 60–?)
EOSINOPHIL # BLD AUTO: 0.18 X10(3) UL (ref 0–0.7)
EOSINOPHIL NFR BLD AUTO: 2.2 %
ERYTHROCYTE [DISTWIDTH] IN BLOOD BY AUTOMATED COUNT: 12.1 %
EST. AVERAGE GLUCOSE BLD GHB EST-MCNC: 114 MG/DL (ref 68–126)
FASTING PATIENT LIPID ANSWER: YES
FASTING STATUS PATIENT QL REPORTED: YES
GLOBULIN PLAS-MCNC: 5.2 G/DL (ref 2.8–4.4)
GLUCOSE BLD-MCNC: 108 MG/DL (ref 70–99)
HBA1C MFR BLD: 5.6 % (ref ?–5.7)
HCT VFR BLD AUTO: 42.8 %
HDLC SERPL-MCNC: 23 MG/DL (ref 40–59)
HGB BLD-MCNC: 14.5 G/DL
IMM GRANULOCYTES # BLD AUTO: 0.08 X10(3) UL (ref 0–1)
IMM GRANULOCYTES NFR BLD: 1 %
LDLC SERPL CALC-MCNC: 63 MG/DL (ref ?–100)
LYMPHOCYTES # BLD AUTO: 2.06 X10(3) UL (ref 1–4)
LYMPHOCYTES NFR BLD AUTO: 25.4 %
MCH RBC QN AUTO: 28.4 PG (ref 26–34)
MCHC RBC AUTO-ENTMCNC: 33.9 G/DL (ref 31–37)
MCV RBC AUTO: 83.8 FL
MONOCYTES # BLD AUTO: 0.63 X10(3) UL (ref 0.1–1)
MONOCYTES NFR BLD AUTO: 7.8 %
NEUTROPHILS # BLD AUTO: 5.1 X10 (3) UL (ref 1.5–7.7)
NEUTROPHILS # BLD AUTO: 5.1 X10(3) UL (ref 1.5–7.7)
NEUTROPHILS NFR BLD AUTO: 63 %
NONHDLC SERPL-MCNC: 86 MG/DL (ref ?–130)
OSMOLALITY SERPL CALC.SUM OF ELEC: 283 MOSM/KG (ref 275–295)
PLATELET # BLD AUTO: 353 10(3)UL (ref 150–450)
POTASSIUM SERPL-SCNC: 3.5 MMOL/L (ref 3.5–5.1)
PROT SERPL-MCNC: 8.7 G/DL (ref 6.4–8.2)
RBC # BLD AUTO: 5.11 X10(6)UL
SODIUM SERPL-SCNC: 136 MMOL/L (ref 136–145)
TRIGL SERPL-MCNC: 123 MG/DL (ref 30–149)
TSI SER-ACNC: 1.01 MIU/ML (ref 0.36–3.74)
VLDLC SERPL CALC-MCNC: 18 MG/DL (ref 0–30)
WBC # BLD AUTO: 8.1 X10(3) UL (ref 4–11)

## 2023-09-25 PROCEDURE — 83036 HEMOGLOBIN GLYCOSYLATED A1C: CPT

## 2023-09-25 PROCEDURE — 84443 ASSAY THYROID STIM HORMONE: CPT

## 2023-09-25 PROCEDURE — 93970 EXTREMITY STUDY: CPT | Performed by: STUDENT IN AN ORGANIZED HEALTH CARE EDUCATION/TRAINING PROGRAM

## 2023-09-25 PROCEDURE — 80053 COMPREHEN METABOLIC PANEL: CPT

## 2023-09-25 PROCEDURE — 36415 COLL VENOUS BLD VENIPUNCTURE: CPT

## 2023-09-25 PROCEDURE — 80061 LIPID PANEL: CPT

## 2023-09-25 PROCEDURE — 85025 COMPLETE CBC W/AUTO DIFF WBC: CPT

## 2024-06-04 PROBLEM — F33.0 MILD EPISODE OF RECURRENT MAJOR DEPRESSIVE DISORDER: Status: ACTIVE | Noted: 2019-05-14

## 2024-06-04 PROBLEM — I21.9 MYOCARDIAL INFARCTION (HCC): Status: ACTIVE | Noted: 2019-01-09

## 2024-06-04 PROBLEM — F33.0 MILD EPISODE OF RECURRENT MAJOR DEPRESSIVE DISORDER (HCC): Status: ACTIVE | Noted: 2019-05-14

## 2024-06-04 PROBLEM — L03.115 CELLULITIS OF RIGHT LOWER LIMB: Status: ACTIVE | Noted: 2024-06-04

## 2024-06-04 PROBLEM — I20.0 UNSTABLE ANGINA (HCC): Status: ACTIVE | Noted: 2022-09-27

## 2024-06-04 PROBLEM — B35.3 TINEA PEDIS: Status: ACTIVE | Noted: 2024-06-04

## 2024-08-27 ENCOUNTER — OFFICE VISIT (OUTPATIENT)
Dept: INTERNAL MEDICINE CLINIC | Facility: CLINIC | Age: 54
End: 2024-08-27
Payer: COMMERCIAL

## 2024-08-27 VITALS
SYSTOLIC BLOOD PRESSURE: 120 MMHG | OXYGEN SATURATION: 97 % | HEART RATE: 109 BPM | TEMPERATURE: 97 F | DIASTOLIC BLOOD PRESSURE: 78 MMHG | WEIGHT: 203 LBS | BODY MASS INDEX: 31 KG/M2

## 2024-08-27 DIAGNOSIS — K21.9 GASTROESOPHAGEAL REFLUX DISEASE, UNSPECIFIED WHETHER ESOPHAGITIS PRESENT: ICD-10-CM

## 2024-08-27 DIAGNOSIS — E55.9 VITAMIN D DEFICIENCY: ICD-10-CM

## 2024-08-27 DIAGNOSIS — I25.10 CORONARY ARTERY DISEASE INVOLVING NATIVE CORONARY ARTERY OF NATIVE HEART WITHOUT ANGINA PECTORIS: Primary | ICD-10-CM

## 2024-08-27 DIAGNOSIS — E78.2 MIXED HYPERLIPIDEMIA: ICD-10-CM

## 2024-08-27 DIAGNOSIS — Z12.5 SCREENING FOR PROSTATE CANCER: ICD-10-CM

## 2024-08-27 DIAGNOSIS — Z12.11 SCREENING FOR COLON CANCER: ICD-10-CM

## 2024-08-27 DIAGNOSIS — Z13.29 SCREENING FOR THYROID DISORDER: ICD-10-CM

## 2024-08-27 DIAGNOSIS — Z95.5 S/P DRUG ELUTING CORONARY STENT PLACEMENT: ICD-10-CM

## 2024-08-27 DIAGNOSIS — I10 ESSENTIAL HYPERTENSION: ICD-10-CM

## 2024-08-27 DIAGNOSIS — F33.0 MILD EPISODE OF RECURRENT MAJOR DEPRESSIVE DISORDER (HCC): ICD-10-CM

## 2024-08-27 PROCEDURE — 99203 OFFICE O/P NEW LOW 30 MIN: CPT | Performed by: FAMILY MEDICINE

## 2024-08-27 PROCEDURE — 3078F DIAST BP <80 MM HG: CPT | Performed by: FAMILY MEDICINE

## 2024-08-27 PROCEDURE — 3074F SYST BP LT 130 MM HG: CPT | Performed by: FAMILY MEDICINE

## 2024-08-27 RX ORDER — LOSARTAN POTASSIUM 25 MG/1
25 TABLET ORAL DAILY
COMMUNITY

## 2024-08-27 RX ORDER — LOSARTAN POTASSIUM 25 MG/1
25 TABLET ORAL DAILY
COMMUNITY
End: 2024-08-27

## 2024-08-27 NOTE — PROGRESS NOTES
Juan Jose Hinojosa  6/6/1970    Chief Complaint   Patient presents with    Establish Care     NP establish care          HPI:   Juan Jose Hinojosa is a 54 year old male who presents to establish care. He has history of CAD s/p ANDREAS, depression, and GERD. Needs new specialists due to change in insurance. Still following with his psychiatrist.     Current Outpatient Medications   Medication Sig Dispense Refill    clopidogrel 75 MG Oral Tab TAKE 1 TABLET BY MOUTH EVERY DAY , DO NOT DISCONTINUE UNLESS INSTRUCTED BY CARDIOLOGY for 90      metoprolol succinate ER 25 MG Oral Tablet 24 Hr Take 1 tablet (25 mg total) by mouth daily.      rosuvastatin 20 MG Oral Tab Take 1 tablet (20 mg total) by mouth nightly. Will need labs for next refill. 90 tablet 2    ARIPiprazole 5 MG Oral Tab Take 1 tablet (5 mg total) by mouth daily.      aspirin 81 MG Oral Tab Take 1 tablet (81 mg total) by mouth daily.      losartan 25 MG Oral Tab Take 1 tablet (25 mg total) by mouth daily. (Patient not taking: Reported on 8/27/2024)      buPROPion 150 MG Oral Tablet 24 Hr Take 150 mg by mouth daily. (Patient not taking: Reported on 8/27/2024)      propranolol 10 MG Oral Tab TAKE 1 TO 2 TABLETS BY MOUTH ONCE DAILY AS NEEDED FOR PERFORMANCE AND SOCIAL ANXIETY (Patient not taking: Reported on 8/27/2024)      VIIBRYD 20 MG Oral Tab  (Patient not taking: Reported on 8/27/2024)      amLODIPine 5 MG Oral Tab TK 1 T PO  D (Patient not taking: Reported on 8/27/2024) 90 tablet 3      No Known Allergies   Past Medical History:    Blood in the stool    Decorative tattoo    Fatigue    Heartburn    High cholesterol    History of depression    Itch of skin    Stented coronary artery      Patient Active Problem List   Diagnosis    Hospital discharge follow-up    Coronary artery disease involving native coronary artery of native heart without angina pectoris    Mixed hyperlipidemia    Essential hypertension    S/P drug eluting coronary stent placement (2.75 x 15 mm ANDREAS mid  CCx) 01/2019    Abnormal EKG    Fatigue, unspecified type    Calculus of kidney    Cellulitis of right lower limb    Chronic nonalcoholic liver disease    Depression    Mild episode of recurrent major depressive disorder (HCC)    Myocardial infarction (HCC)    Tinea pedis    Unstable angina (HCC)      History reviewed. No pertinent surgical history.   Family History   Problem Relation Age of Onset    Heart Attack Mother       Social History     Socioeconomic History    Marital status:    Tobacco Use    Smoking status: Former    Smokeless tobacco: Never   Vaping Use    Vaping status: Never Used   Substance and Sexual Activity    Alcohol use: Yes    Drug use: Yes     Types: Cannabis   Other Topics Concern    Seat Belt Yes     Social Determinants of Health      [facility]    Social Connections    [facility]    Housing Stability         REVIEW OF SYSTEMS:   GENERAL: feels well otherwise  LUNGS: no new dyspnea  CARDIOVASCULAR: no new chest pain    EXAM:   /78 (BP Location: Left arm, Patient Position: Sitting, Cuff Size: large)   Pulse 109   Temp 97.4 °F (36.3 °C) (Temporal)   Wt 203 lb (92.1 kg)   SpO2 97%   BMI 30.87 kg/m²   GENERAL: Well developed, well nourished,in no apparent distress  SKIN: No rash  HEENT: atraumatic, normocephalic  LUNGS: clear to auscultation  CARDIO: RRR without murmur  GI: soft, NT    ASSESSMENT AND PLAN:   Juan Jose Hinojosa is a 54 year old male who presents to establish care    CAD S/P drug eluting coronary stent placement (2.75 x 15 mm ANDREAS mid CCx) 01/2019  Referral placed to establish with new cardiologist. Continue GDMT.   - [facility] CARDIOLOGY EXTERNAL    Mixed hyperlipidemia  Continue statin. Check labs prior to CPE  - Comp Metabolic Panel (14); Future  - Lipid Panel; Future    Essential hypertension  Controlled on current treatment.    - CBC W Differential W Platelet [E]; Future    GERD  Screening for colon cancer  Had EGD and colonoscopy planned, but insurance changed. Referral placed to establish with GI  - Gastro Referral - In Network    Mild episode of recurrent major depressive disorder (HCC)  Stable, following with psychiatry    Screening for prostate cancer  - PSA, Total (Screening) [E]; Future    Vitamin D deficiency  Check level  - Vitamin D [E]; Future    F/U in 3 months for CPE    All questions were answered and the patient agrees with the plan.     Thank you,  Juan Jose Finn MD

## 2024-09-24 NOTE — TELEPHONE ENCOUNTER
[id number] - [place], [place] - [address] [phone], [phone]     Patient's wife, on HIPAA, stated patient is new to Dr. Juan Jose Finn and needs the below script.    rosuvastatin 20 MG Oral Tab 90 tablet 2 4/6/2022 --    Sig - Route: Take 1 tablet (20 mg total) by mouth nightly. Will need labs for next refill. - Oral    Sent to pharmacy as: Rosuvastatin Calcium 20 MG Oral Tablet (CRESTOR)

## 2024-09-28 NOTE — TELEPHONE ENCOUNTER
Rosuvastatin refill request. LOV 8/27. Last labs 9/25/23. Message sent to pt in [facility] reminding him to get labs done. Please approve or deny pending Rx.

## 2024-09-29 RX ORDER — ROSUVASTATIN CALCIUM 20 MG/1
20 TABLET, COATED ORAL NIGHTLY
Qty: 90 TABLET | Refills: 0 | Status: SHIPPED | OUTPATIENT
Start: 2024-09-29

## 2024-11-05 ENCOUNTER — LAB ENCOUNTER (OUTPATIENT)
Dept: LAB | Age: 54
End: 2024-11-05
Attending: FAMILY MEDICINE
Payer: COMMERCIAL

## 2024-11-05 DIAGNOSIS — E55.9 VITAMIN D DEFICIENCY: ICD-10-CM

## 2024-11-05 DIAGNOSIS — E78.2 MIXED HYPERLIPIDEMIA: ICD-10-CM

## 2024-11-05 DIAGNOSIS — Z13.29 SCREENING FOR THYROID DISORDER: ICD-10-CM

## 2024-11-05 DIAGNOSIS — I10 ESSENTIAL HYPERTENSION: ICD-10-CM

## 2024-11-05 DIAGNOSIS — Z12.5 SCREENING FOR PROSTATE CANCER: ICD-10-CM

## 2024-11-05 LAB
ALBUMIN SERPL-MCNC: 5.2 G/DL (ref 3.2–4.8)
ALBUMIN/GLOB SERPL: 1.4 {RATIO} (ref 1–2)
ALP LIVER SERPL-CCNC: 85 U/L
ALT SERPL-CCNC: 48 U/L
ANION GAP SERPL CALC-SCNC: 6 MMOL/L (ref 0–18)
AST SERPL-CCNC: 42 U/L (ref ?–34)
BASOPHILS # BLD AUTO: 0.04 X10(3) UL (ref 0–0.2)
BASOPHILS NFR BLD AUTO: 0.4 %
BILIRUB SERPL-MCNC: 0.8 MG/DL (ref 0.3–1.2)
BUN BLD-MCNC: 11 MG/DL (ref 9–23)
CALCIUM BLD-MCNC: 10.3 MG/DL (ref 8.7–10.4)
CHLORIDE SERPL-SCNC: 100 MMOL/L (ref 98–112)
CHOLEST SERPL-MCNC: 132 MG/DL (ref ?–200)
CO2 SERPL-SCNC: 32 MMOL/L (ref 21–32)
COMPLEXED PSA SERPL-MCNC: 0.44 NG/ML (ref ?–4)
CREAT BLD-MCNC: 0.96 MG/DL
EGFRCR SERPLBLD CKD-EPI 2021: 93 ML/MIN/1.73M2 (ref 60–?)
EOSINOPHIL # BLD AUTO: 0.15 X10(3) UL (ref 0–0.7)
EOSINOPHIL NFR BLD AUTO: 1.6 %
ERYTHROCYTE [DISTWIDTH] IN BLOOD BY AUTOMATED COUNT: 12.8 %
FASTING PATIENT LIPID ANSWER: YES
FASTING STATUS PATIENT QL REPORTED: YES
GLOBULIN PLAS-MCNC: 3.6 G/DL (ref 2–3.5)
GLUCOSE BLD-MCNC: 89 MG/DL (ref 70–99)
HCT VFR BLD AUTO: 48.3 %
HDLC SERPL-MCNC: 36 MG/DL (ref 40–59)
HGB BLD-MCNC: 16.8 G/DL
IMM GRANULOCYTES # BLD AUTO: 0.02 X10(3) UL (ref 0–1)
IMM GRANULOCYTES NFR BLD: 0.2 %
LDLC SERPL CALC-MCNC: 65 MG/DL (ref ?–100)
LYMPHOCYTES # BLD AUTO: 2.78 X10(3) UL (ref 1–4)
LYMPHOCYTES NFR BLD AUTO: 30 %
MCH RBC QN AUTO: 28.9 PG (ref 26–34)
MCHC RBC AUTO-ENTMCNC: 34.8 G/DL (ref 31–37)
MCV RBC AUTO: 83 FL
MONOCYTES # BLD AUTO: 0.83 X10(3) UL (ref 0.1–1)
MONOCYTES NFR BLD AUTO: 8.9 %
NEUTROPHILS # BLD AUTO: 5.46 X10 (3) UL (ref 1.5–7.7)
NEUTROPHILS # BLD AUTO: 5.46 X10(3) UL (ref 1.5–7.7)
NEUTROPHILS NFR BLD AUTO: 58.9 %
NONHDLC SERPL-MCNC: 96 MG/DL (ref ?–130)
OSMOLALITY SERPL CALC.SUM OF ELEC: 285 MOSM/KG (ref 275–295)
PLATELET # BLD AUTO: 256 10(3)UL (ref 150–450)
POTASSIUM SERPL-SCNC: 4 MMOL/L (ref 3.5–5.1)
PROT SERPL-MCNC: 8.8 G/DL (ref 5.7–8.2)
RBC # BLD AUTO: 5.82 X10(6)UL
SODIUM SERPL-SCNC: 138 MMOL/L (ref 136–145)
TRIGL SERPL-MCNC: 182 MG/DL (ref 30–149)
TSI SER-ACNC: 2.38 UIU/ML (ref 0.55–4.78)
VIT D+METAB SERPL-MCNC: 65.3 NG/ML (ref 30–100)
VLDLC SERPL CALC-MCNC: 27 MG/DL (ref 0–30)
WBC # BLD AUTO: 9.3 X10(3) UL (ref 4–11)

## 2024-11-05 PROCEDURE — 84153 ASSAY OF PSA TOTAL: CPT | Performed by: FAMILY MEDICINE

## 2024-11-05 PROCEDURE — 80061 LIPID PANEL: CPT | Performed by: FAMILY MEDICINE

## 2024-11-05 PROCEDURE — 80050 GENERAL HEALTH PANEL: CPT | Performed by: FAMILY MEDICINE

## 2024-11-05 PROCEDURE — 82306 VITAMIN D 25 HYDROXY: CPT | Performed by: FAMILY MEDICINE

## 2024-11-12 ENCOUNTER — OFFICE VISIT (OUTPATIENT)
Dept: INTERNAL MEDICINE CLINIC | Facility: CLINIC | Age: 54
End: 2024-11-12
Payer: COMMERCIAL

## 2024-11-12 VITALS
BODY MASS INDEX: 31 KG/M2 | TEMPERATURE: 97 F | HEART RATE: 90 BPM | OXYGEN SATURATION: 98 % | DIASTOLIC BLOOD PRESSURE: 84 MMHG | WEIGHT: 206 LBS | SYSTOLIC BLOOD PRESSURE: 130 MMHG

## 2024-11-12 DIAGNOSIS — R79.89 ELEVATED LFTS: ICD-10-CM

## 2024-11-12 DIAGNOSIS — K21.9 GASTROESOPHAGEAL REFLUX DISEASE, UNSPECIFIED WHETHER ESOPHAGITIS PRESENT: ICD-10-CM

## 2024-11-12 DIAGNOSIS — E55.9 VITAMIN D DEFICIENCY: ICD-10-CM

## 2024-11-12 DIAGNOSIS — I25.10 CORONARY ARTERY DISEASE INVOLVING NATIVE CORONARY ARTERY OF NATIVE HEART WITHOUT ANGINA PECTORIS: ICD-10-CM

## 2024-11-12 DIAGNOSIS — R77.9 ELEVATED SERUM PROTEIN LEVEL: Primary | ICD-10-CM

## 2024-11-12 DIAGNOSIS — F33.0 MILD EPISODE OF RECURRENT MAJOR DEPRESSIVE DISORDER (HCC): ICD-10-CM

## 2024-11-12 DIAGNOSIS — Z00.00 WELLNESS EXAMINATION: ICD-10-CM

## 2024-11-12 DIAGNOSIS — Z95.5 S/P DRUG ELUTING CORONARY STENT PLACEMENT: ICD-10-CM

## 2024-11-12 DIAGNOSIS — I10 ESSENTIAL HYPERTENSION: ICD-10-CM

## 2024-11-12 DIAGNOSIS — Z12.11 SCREENING FOR COLON CANCER: ICD-10-CM

## 2024-11-12 DIAGNOSIS — E78.2 MIXED HYPERLIPIDEMIA: ICD-10-CM

## 2024-11-12 PROCEDURE — 99396 PREV VISIT EST AGE 40-64: CPT | Performed by: FAMILY MEDICINE

## 2024-11-12 PROCEDURE — 3075F SYST BP GE 130 - 139MM HG: CPT | Performed by: FAMILY MEDICINE

## 2024-11-12 PROCEDURE — 3079F DIAST BP 80-89 MM HG: CPT | Performed by: FAMILY MEDICINE

## 2024-11-12 PROCEDURE — 90656 IIV3 VACC NO PRSV 0.5 ML IM: CPT | Performed by: FAMILY MEDICINE

## 2024-11-12 PROCEDURE — 90471 IMMUNIZATION ADMIN: CPT | Performed by: FAMILY MEDICINE

## 2024-11-12 NOTE — PROGRESS NOTES
Juan Jose Hinojosa  6/6/1970    Chief Complaint   Patient presents with    Physical       HPI:   Juan Jose Hinojosa is a 54 year old male who presents for CPE. Overall stable since our last visit. Has colonoscopy planned for early next year. Following regularly with his cardiologist and psychiatrist. Consistent with medications.     Current Outpatient Medications   Medication Sig Dispense Refill    rosuvastatin 20 MG Oral Tab Take 1 tablet (20 mg total) by mouth nightly. Will need labs for next refill. 90 tablet 0    losartan 25 MG Oral Tab Take 1 tablet (25 mg total) by mouth daily.      clopidogrel 75 MG Oral Tab TAKE 1 TABLET BY MOUTH EVERY DAY , DO NOT DISCONTINUE UNLESS INSTRUCTED BY CARDIOLOGY for 90      metoprolol succinate ER 25 MG Oral Tablet 24 Hr Take 1 tablet (25 mg total) by mouth daily.      ARIPiprazole 5 MG Oral Tab Take 1 tablet (5 mg total) by mouth daily.      aspirin 81 MG Oral Tab Take 1 tablet (81 mg total) by mouth daily.      amLODIPine 5 MG Oral Tab TK 1 T PO  D (Patient not taking: Reported on 11/12/2024) 90 tablet 3      Allergies[1]   Past Medical History:    Blood in the stool    Decorative tattoo    Fatigue    Heartburn    High cholesterol    History of depression    Itch of skin    Stented coronary artery      Patient Active Problem List   Diagnosis    Hospital discharge follow-up    Coronary artery disease involving native coronary artery of native heart without angina pectoris    Mixed hyperlipidemia    Essential hypertension    S/P drug eluting coronary stent placement (2.75 x 15 mm ANDREAS mid CCx) 01/2019    Abnormal EKG    Fatigue, unspecified type    Calculus of kidney    Cellulitis of right lower limb    Chronic nonalcoholic liver disease    Depression    Mild episode of recurrent major depressive disorder (HCC)    Myocardial infarction (HCC)    Tinea pedis    Unstable angina (HCC)      History reviewed. No pertinent surgical history.   Family History   Problem Relation Age of Onset     High Blood Pressure Mother     High Cholesterol Mother     Dementia Father     High Cholesterol Maternal Grandmother     High Blood Pressure Maternal Grandmother       Social History     Socioeconomic History    Marital status:    Tobacco Use    Smoking status: Former    Smokeless tobacco: Former     Quit date: 2009   Vaping Use    Vaping status: Never Used   Substance and Sexual Activity    Alcohol use: Yes    Drug use: Yes     Types: Cannabis   Other Topics Concern    Seat Belt Yes     Social Drivers of Health      Received from [facility], [facility]    Social Connections    Received from [facility], [facility]    Housing Stability         REVIEW OF SYSTEMS:   GENERAL: feels well otherwise  SKIN: no rash  EYES: no new vision changes  HEENT: not congested  LUNGS: no new dyspnea  CARDIOVASCULAR: no new chest pain  GI: no new abdominal pain  NEURO: no headaches    EXAM:   /84 (BP Location: Left arm, Patient Position: Sitting, Cuff Size: large)   Pulse 90   Temp 97 °F (36.1 °C) (Temporal)   Wt 206 lb (93.4 kg)   SpO2 98%   BMI 31.32 kg/m²   GENERAL: Well developed, well nourished,in no apparent distress  SKIN: No rashes,no suspicious lesions  EYES: PERRLA, EOMI, conjunctiva are clear  HEENT: atraumatic, normocephalic,ears and throat are clear  NECK: supple,no adenopathy,no bruits  LUNGS: clear to auscultation  CARDIO: RRR without murmur  GI: good BS's,no masses, HSM or tenderness    ASSESSMENT AND PLAN:   Juan Jose Hinojosa is a 54 year old male who presents for CPE    Wellness Exam  Discussed age appropriate health and wellness. Deferred vaccines other than influenza today    Coronary artery disease involving native coronary artery of native heart without angina pectoris  S/P drug eluting coronary stent placement (2.75 x 15 mm ANDREAS mid CCx) 01/2019  Compensated cardiac status. Following with cardiology at [facility].     [facility]  hyperlipidemia  Continue statin, LDL at goal. Discussed low processed food diet to improve TG levels.     Essential hypertension  Controlled.     Gastroesophageal reflux disease, unspecified whether esophagitis present  Elevated LFTs/hepatic steatosis  Screening for colon cancer  Has upcoming colonoscopy planned and GI follow-up. Discussed low processed sugar diet to improve hepatic steatosis.   - Comp Metabolic Panel (14); Future    Mild episode of recurrent major depressive disorder (HCC)  Overall stable. Following with psychiatry    Vitamin D deficiency  Discussed transitioning to daily supplement, 1000 international units daily    Elevated serum protein level  Recheck CMP in 3 months.   - Comp Metabolic Panel (14); Future    F/U in 6 months    All questions were answered and the patient agrees with the plan.     Thank you,  Juan Jose Finn MD         [1] No Known Allergies

## 2025-01-10 ENCOUNTER — HOSPITAL ENCOUNTER (OUTPATIENT)
Dept: GENERAL RADIOLOGY | Age: 55
Discharge: HOME | End: 2025-01-10
Attending: PHYSICIAN ASSISTANT
Payer: COMMERCIAL

## 2025-01-10 ENCOUNTER — OFFICE VISIT (OUTPATIENT)
Dept: FAMILY MEDICINE CLINIC | Facility: CLINIC | Age: 55
End: 2025-01-10
Payer: COMMERCIAL

## 2025-01-10 VITALS
RESPIRATION RATE: 20 BRPM | TEMPERATURE: 100 F | HEIGHT: 67.32 IN | BODY MASS INDEX: 31.31 KG/M2 | OXYGEN SATURATION: 98 % | WEIGHT: 201.81 LBS | HEART RATE: 120 BPM | DIASTOLIC BLOOD PRESSURE: 82 MMHG | SYSTOLIC BLOOD PRESSURE: 136 MMHG

## 2025-01-10 DIAGNOSIS — J06.9 VIRAL URI WITH COUGH: Primary | ICD-10-CM

## 2025-01-10 DIAGNOSIS — R00.0 TACHYCARDIA: ICD-10-CM

## 2025-01-10 DIAGNOSIS — R05.1 ACUTE COUGH: ICD-10-CM

## 2025-01-10 PROCEDURE — 3008F BODY MASS INDEX DOCD: CPT | Performed by: PHYSICIAN ASSISTANT

## 2025-01-10 PROCEDURE — 3079F DIAST BP 80-89 MM HG: CPT | Performed by: PHYSICIAN ASSISTANT

## 2025-01-10 PROCEDURE — 3075F SYST BP GE 130 - 139MM HG: CPT | Performed by: PHYSICIAN ASSISTANT

## 2025-01-10 PROCEDURE — 71046 X-RAY EXAM CHEST 2 VIEWS: CPT | Performed by: PHYSICIAN ASSISTANT

## 2025-01-10 PROCEDURE — 99214 OFFICE O/P EST MOD 30 MIN: CPT | Performed by: PHYSICIAN ASSISTANT

## 2025-01-10 NOTE — PATIENT INSTRUCTIONS
Rest   Push fluids   Tylenol OTC as needed for pain/fever   Claritin or Zyrtec OTC once daily for nasal drainage  Contagious until fever free for 24 hours without medication help   Please follow up with PCP if no improvement or if symptoms worsen

## 2025-01-10 NOTE — PROGRESS NOTES
CHIEF COMPLAINT:     Chief Complaint   Patient presents with    Cough     Urged to get checked for pneumonia. - Entered by patient         HPI:   Juan Jose Hinojosa is a 54 year old male who presents for cold symptoms for 3-4 days.  Tactile fever yesterday. + body aches/chills yesterday. No headache. + nasal congestion. No ear pain. + sore throat- worse in the am. Wet cough. No chest pain or SOB. No hx of pneumonia.  No GI symptoms. Negative strep/covid/flu testing at [facility] earlier today. Referred here to ensure no pneumonia. Has been taking Tylenol. Took Tylenol prior to visit. Temp at [facility] 100.0        Current Outpatient Medications   Medication Sig Dispense Refill    rosuvastatin 20 MG Oral Tab Take 1 tablet (20 mg total) by mouth nightly. Will need labs for next refill. 90 tablet 0    losartan 25 MG Oral Tab Take 1 tablet (25 mg total) by mouth daily.      clopidogrel 75 MG Oral Tab TAKE 1 TABLET BY MOUTH EVERY DAY , DO NOT DISCONTINUE UNLESS INSTRUCTED BY CARDIOLOGY for 90      metoprolol succinate ER 25 MG Oral Tablet 24 Hr Take 1 tablet (25 mg total) by mouth daily.      ARIPiprazole 5 MG Oral Tab Take 1 tablet (5 mg total) by mouth daily.      amLODIPine 5 MG Oral Tab TK 1 T PO  D 90 tablet 3    aspirin 81 MG Oral Tab Take 1 tablet (81 mg total) by mouth daily.        Past Medical History:    Blood in the stool    Decorative tattoo    Fatigue    Heartburn    High cholesterol    History of depression    Itch of skin    Stented coronary artery      Social History:  Social History     Socioeconomic History    Marital status:    Tobacco Use    Smoking status: Former    Smokeless tobacco: Former     Quit date: 2009   Vaping Use    Vaping status: Never Used   Substance and Sexual Activity    Alcohol use: Yes    Drug use: Yes     Types: Cannabis   Other Topics Concern    Seat Belt Yes     Social Drivers of Health      Received from [facility], [facility]     Social Connections    Received [facility], [facility]    Housing Stability        REVIEW OF SYSTEMS:   GENERAL: See HPI   SKIN: No rashes, or other skin lesions.   EYES: Denies blurred vision or double vision.  HENT: See HPI  CARDIOVASCULAR: Denies chest pain or palpitations  LUNGS: Per HPI.  GI: Denies N/V/C/D or abdominal pain.      EXAM:   /82 (BP Location: Left arm, Patient Position: Sitting, Cuff Size: large)   Pulse 120   Temp 99.5 °F (37.5 °C) (Oral)   Resp 20   Ht 5' 7.32\" (1.71 m)   Wt 201 lb 12.8 oz (91.5 kg)   SpO2 98%   BMI 31.30 kg/m²   Physical Exam  Constitutional:       General: He is not in acute distress.     Appearance: Normal appearance.   HENT:      Head: Normocephalic.      Right Ear: Tympanic membrane, ear canal and external ear normal.      Left Ear: Tympanic membrane, ear canal and external ear normal.      Nose: Rhinorrhea present.      Mouth/Throat:      Mouth: Mucous membranes are moist.      Pharynx: Oropharynx is clear. Uvula midline. Posterior oropharyngeal erythema (post nasal drip) present.      Tonsils: No tonsillar exudate.   Eyes:      Conjunctiva/sclera: Conjunctivae normal.      Pupils: Pupils are equal, round, and reactive to light.   Cardiovascular:      Rate and Rhythm: Regular rhythm. Tachycardia present.      Heart sounds: Normal heart sounds. No murmur heard.  Pulmonary:      Effort: Pulmonary effort is normal. No respiratory distress.      Breath sounds: Examination of the right-upper field reveals rhonchi. Examination of the left-upper field reveals rhonchi. Rhonchi present. No decreased breath sounds or wheezing.   Chest:      Chest wall: No tenderness.   Musculoskeletal:      Cervical back: Normal range of motion and neck supple.   Lymphadenopathy:      Cervical: No cervical adenopathy.   Skin:     General: Skin is warm.      Findings: No rash.   Neurological:      Mental Status: He is alert and oriented to  person, place, and time.       No results found for this or any previous visit (from the past 24 hours).       ASSESSMENT AND PLAN:     Juan Jose Hinojosa is a 54 year old male who presents with:     ASSESSMENT:  Encounter Diagnoses   Name Primary?    Viral URI with cough Yes    Tachycardia        PLAN:  Meds as below.  Comfort Care as listed in Patient Instructions.      Patient [facility]  Chest xray negative   Suspect viral   Supportive care   Close follow up with PCP for repeat exam   ED for any chest pain or breathing issues     Discussed case with Dr Gaona         The patient indicates understanding of these issues and agrees to the plan.      Patient Instructions   Rest   Push fluids   Tylenol OTC as needed for pain/fever   Claritin or Zyrtec OTC once daily for nasal drainage  Contagious until fever free for 24 hours without medication help   Please follow up with PCP if no improvement or if symptoms worsen

## 2025-04-29 ENCOUNTER — LAB ENCOUNTER (OUTPATIENT)
Dept: LAB | Age: 55
End: 2025-04-29
Attending: FAMILY MEDICINE
Payer: COMMERCIAL

## 2025-04-29 DIAGNOSIS — R79.89 ELEVATED LFTS: ICD-10-CM

## 2025-04-29 DIAGNOSIS — R77.9 ELEVATED SERUM PROTEIN LEVEL: ICD-10-CM

## 2025-04-29 LAB
ALBUMIN SERPL-MCNC: 4.9 G/DL (ref 3.2–4.8)
ALBUMIN/GLOB SERPL: 1.5 {RATIO} (ref 1–2)
ALP LIVER SERPL-CCNC: 83 U/L (ref 45–117)
ALT SERPL-CCNC: 28 U/L (ref 10–49)
ANION GAP SERPL CALC-SCNC: 5 MMOL/L (ref 0–18)
AST SERPL-CCNC: 27 U/L (ref ?–34)
BILIRUB SERPL-MCNC: 0.7 MG/DL (ref 0.3–1.2)
BUN BLD-MCNC: 14 MG/DL (ref 9–23)
CALCIUM BLD-MCNC: 10.3 MG/DL (ref 8.7–10.6)
CHLORIDE SERPL-SCNC: 101 MMOL/L (ref 98–112)
CO2 SERPL-SCNC: 30 MMOL/L (ref 21–32)
CREAT BLD-MCNC: 1.11 MG/DL (ref 0.7–1.3)
EGFRCR SERPLBLD CKD-EPI 2021: 78 ML/MIN/1.73M2 (ref 60–?)
FASTING STATUS PATIENT QL REPORTED: YES
GLOBULIN PLAS-MCNC: 3.3 G/DL (ref 2–3.5)
GLUCOSE BLD-MCNC: 92 MG/DL (ref 70–99)
OSMOLALITY SERPL CALC.SUM OF ELEC: 282 MOSM/KG (ref 275–295)
POTASSIUM SERPL-SCNC: 4.6 MMOL/L (ref 3.5–5.1)
PROT SERPL-MCNC: 8.2 G/DL (ref 5.7–8.2)
SODIUM SERPL-SCNC: 136 MMOL/L (ref 136–145)

## 2025-04-29 PROCEDURE — 80053 COMPREHEN METABOLIC PANEL: CPT | Performed by: FAMILY MEDICINE

## 2025-05-01 NOTE — TELEPHONE ENCOUNTER
Juan Jose Hinojosa requesting Medication Refill for:    Medication name and dose (copy and paste from medication list):     90 day supply  Medication Quantity Refills Start End   rosuvastatin 20 MG Oral Tab 90 tablet 0 9/29/2024 --   Sig:   Take 1 tablet (20 mg total) by mouth nightly. Will need labs for next refill.     Route:   Oral     Order #:   [id number]         If medication is not on medication list - transfer patient to RN queue for triage    Preferred Pharmacy:   [id number] - [place], [place] - [address] [phone], [phone]   [facility] [facility] [address]   Phone: [phone] Fax: [phone]   Hours: Not open 24 hours       LOV: 11/12/2024   Last Refill date: 09/29/25  Next Scheduled appointment: 5/20/2025

## 2025-05-02 RX ORDER — ROSUVASTATIN CALCIUM 20 MG/1
20 TABLET, COATED ORAL NIGHTLY
Qty: 90 TABLET | Refills: 0 | Status: SHIPPED | OUTPATIENT
Start: 2025-05-02

## 2025-05-20 ENCOUNTER — TELEPHONE (OUTPATIENT)
Age: 55
End: 2025-05-20

## 2025-05-20 ENCOUNTER — OFFICE VISIT (OUTPATIENT)
Age: 55
End: 2025-05-20
Payer: COMMERCIAL

## 2025-05-20 VITALS
DIASTOLIC BLOOD PRESSURE: 74 MMHG | HEART RATE: 82 BPM | WEIGHT: 203 LBS | TEMPERATURE: 98 F | BODY MASS INDEX: 31.86 KG/M2 | SYSTOLIC BLOOD PRESSURE: 116 MMHG | HEIGHT: 67 IN | RESPIRATION RATE: 14 BRPM | OXYGEN SATURATION: 98 %

## 2025-05-20 DIAGNOSIS — R79.89 ELEVATED LFTS: ICD-10-CM

## 2025-05-20 DIAGNOSIS — G89.29 CHRONIC RIGHT SHOULDER PAIN: ICD-10-CM

## 2025-05-20 DIAGNOSIS — R77.1 ELEVATED SERUM GLOBULIN LEVEL: ICD-10-CM

## 2025-05-20 DIAGNOSIS — I10 ESSENTIAL HYPERTENSION: ICD-10-CM

## 2025-05-20 DIAGNOSIS — M25.511 CHRONIC RIGHT SHOULDER PAIN: ICD-10-CM

## 2025-05-20 DIAGNOSIS — E78.2 MIXED HYPERLIPIDEMIA: ICD-10-CM

## 2025-05-20 DIAGNOSIS — Z00.00 ROUTINE GENERAL MEDICAL EXAMINATION AT A HEALTH CARE FACILITY: ICD-10-CM

## 2025-05-20 DIAGNOSIS — K76.9 CHRONIC NONALCOHOLIC LIVER DISEASE: ICD-10-CM

## 2025-05-20 DIAGNOSIS — I25.10 CORONARY ARTERY DISEASE INVOLVING NATIVE CORONARY ARTERY OF NATIVE HEART WITHOUT ANGINA PECTORIS: ICD-10-CM

## 2025-05-20 DIAGNOSIS — Z12.5 SCREENING FOR MALIGNANT NEOPLASM OF PROSTATE: ICD-10-CM

## 2025-05-20 DIAGNOSIS — I25.10 CORONARY ARTERY DISEASE INVOLVING NATIVE CORONARY ARTERY OF NATIVE HEART WITHOUT ANGINA PECTORIS: Primary | ICD-10-CM

## 2025-05-20 DIAGNOSIS — Z12.11 SCREENING FOR COLON CANCER: ICD-10-CM

## 2025-05-20 DIAGNOSIS — Z95.5 S/P DRUG ELUTING CORONARY STENT PLACEMENT: ICD-10-CM

## 2025-05-20 DIAGNOSIS — I10 ESSENTIAL HYPERTENSION: Primary | ICD-10-CM

## 2025-05-20 PROCEDURE — 90471 IMMUNIZATION ADMIN: CPT | Performed by: FAMILY MEDICINE

## 2025-05-20 PROCEDURE — 90677 PCV20 VACCINE IM: CPT | Performed by: FAMILY MEDICINE

## 2025-05-20 PROCEDURE — 99214 OFFICE O/P EST MOD 30 MIN: CPT | Performed by: FAMILY MEDICINE

## 2025-05-20 PROCEDURE — 3008F BODY MASS INDEX DOCD: CPT | Performed by: FAMILY MEDICINE

## 2025-05-20 PROCEDURE — 3074F SYST BP LT 130 MM HG: CPT | Performed by: FAMILY MEDICINE

## 2025-05-20 PROCEDURE — 3078F DIAST BP <80 MM HG: CPT | Performed by: FAMILY MEDICINE

## 2025-05-20 RX ORDER — AMLODIPINE BESYLATE 5 MG/1
TABLET ORAL
Qty: 90 TABLET | Refills: 1 | Status: SHIPPED | OUTPATIENT
Start: 2025-05-20

## 2025-05-20 RX ORDER — METOPROLOL SUCCINATE 25 MG/1
25 TABLET, EXTENDED RELEASE ORAL DAILY
Qty: 90 TABLET | Refills: 1 | Status: SHIPPED | OUTPATIENT
Start: 2025-05-20

## 2025-05-20 RX ORDER — SODIUM, POTASSIUM,MAG SULFATES 17.5-3.13G
SOLUTION, RECONSTITUTED, ORAL ORAL
COMMUNITY
Start: 2025-05-13

## 2025-05-20 RX ORDER — LOSARTAN POTASSIUM 25 MG/1
25 TABLET ORAL DAILY
Qty: 90 TABLET | Refills: 1 | Status: SHIPPED | OUTPATIENT
Start: 2025-05-20

## 2025-05-20 NOTE — PROGRESS NOTES
Juan Jose Hinojosa  6/6/1970    Chief Complaint   Patient presents with    Follow - Up     6mo. Pt is planning on getting a colonoscopy w/in the next 2mos.  Waiting to get cardiac clearance.       HPI:   Juan Jose Hinojosa is a 54 year old male who presents for follow-up. He is overall doing well. Feels well. He is more aware of his diet, reducing snacking and sugary beverages.  He has upcoming follow-up with his cardiologist and will be getting clearance to proceed with his colonoscopy.  He has been having mild right shoulder pain mainly with reaching and overhead lifting.  This has not greatly affected his day-to-day functioning but he is noticing intermittent discomfort    Current Medications[1]   Allergies[2]   Past Medical History[3]   Problem List[4]   Past Surgical History[5]   Family History[6]   Short Social Hx on File[7]      REVIEW OF SYSTEMS:   GENERAL: feels well otherwise    EXAM:   /74   Pulse 82   Temp 98 °F (36.7 °C) (Temporal)   Resp 14   Ht 5' 7\" (1.702 m)   Wt 203 lb (92.1 kg)   SpO2 98%   BMI 31.79 kg/m²   GENERAL: Well developed, well nourished,in no apparent distress  MSK: Mild restriction of right shoulder passive and active range of motion in forward flexion and abduction.  He has no significant pain with impingement testing.  Negative [place].  Equivocal speeds.  Mild discomfort with resisted rotator cuff testing    ASSESSMENT AND PLAN:   Juan Jose Hinojosa is a 54 year old male who presents for follow-up.    Essential hypertension  Controlled on current treatment    CAD S/P drug eluting coronary stent placement  Compensated cardiac status with no new anginal symptoms.  Has upcoming follow-up with cardiology    Elevated serum globulin level  Elevated LFTs  Serum globulins and LFTs have normalized.  Continue emphasis on healthy diet and consistent exercise and we will repeat these levels at his CPE    Screening for colon cancer  He has upcoming cardiology follow-up to obtain cardiac  clearance for his colonoscopy which he will then schedule    Chronic right shoulder pain  We will begin evaluation with shoulder radiographs to assess for glenohumeral arthritis given his restricted range of motion.  We discussed home strengthening and range of motion program.  Plan to begin physical therapy should symptoms worsen  - XR SHOULDER, COMPLETE (MIN 2 VIEWS), RIGHT (CPT=73030); Future      All questions were answered and the patient agrees with the plan.     Thank you,  Juan Jose Finn MD         [1]   Current Outpatient Medications   Medication Sig Dispense Refill    SUPREP BOWEL PREP KIT 17.5-3.13-1.6 GM/177ML Oral Solution TAKE 354 MILLILITERS BY MOUTH ONE TIME FOR 1 DOSE      rosuvastatin 20 MG Oral Tab Take 1 tablet (20 mg total) by mouth nightly. Will need labs for next refill. 90 tablet 0    losartan 25 MG Oral Tab Take 1 tablet (25 mg total) by mouth daily.      metoprolol succinate ER 25 MG Oral Tablet 24 Hr Take 1 tablet (25 mg total) by mouth daily.      ARIPiprazole 5 MG Oral Tab Take 1 tablet (5 mg total) by mouth daily.      amLODIPine 5 MG Oral Tab TK 1 T PO  D 90 tablet 3    aspirin 81 MG Oral Tab Take 1 tablet (81 mg total) by mouth daily.     [2] No Known Allergies  [3]   Past Medical History:   Blood in the stool    Calculus of kidney    Decorative tattoo    Fatigue    Heartburn    High cholesterol    History of depression    Indigestion    Itch of skin    Nausea    Sleep disturbance    Stented coronary artery    Uncomfortable fullness after meals    Wears glasses   [4]   Patient Active Problem List  Diagnosis    Hospital discharge follow-up    Coronary artery disease involving native coronary artery of native heart without angina pectoris    Mixed hyperlipidemia    Essential hypertension    S/P drug eluting coronary stent placement (2.75 x 15 mm ANDREAS mid CCx) 01/2019    Abnormal EKG    Fatigue, unspecified type    Calculus of kidney    Cellulitis of right lower limb    Chronic  nonalcoholic liver disease    Depression    Mild episode of recurrent major depressive disorder    Myocardial infarction (HCC)    Tinea pedis    Unstable angina (HCC)   [5] History reviewed. No pertinent surgical history.  [6]   Family History  Problem Relation Age of Onset    High Blood Pressure Mother     High Cholesterol Mother     Hypertension Mother     Dementia Father     High Cholesterol Maternal Grandmother     High Blood Pressure Maternal Grandmother    [7]   Social History  Socioeconomic History    Marital status:    Tobacco Use    Smoking status: Former    Smokeless tobacco: Former     Quit date: 2009   Vaping Use    Vaping status: Never Used   Substance and Sexual Activity    Alcohol use: Yes    Drug use: Yes     Types: Cannabis   Other Topics Concern    Seat Belt Yes     Social Drivers of Health      Received from [facility]    Housing Stability

## 2025-05-20 NOTE — TELEPHONE ENCOUNTER
Pre-visit labs ordered per protocol    [employer] message sent to advise patient   Last Accessed: 5/16/2025

## 2025-05-20 NOTE — TELEPHONE ENCOUNTER
Patient called request labs prior to their annual physical.  Annual physical scheduled for   Future Appointments   Date Time Provider Department Center   11/18/2025  1:00 PM Juan Jose Finn MD [facility]      Please order labs. Patient preferred lab is [facility]  Patient informed request was sent to clinical team.  Patient informed to fast for labs.  No callback required.

## 2025-08-01 RX ORDER — ROSUVASTATIN CALCIUM 20 MG/1
20 TABLET, COATED ORAL NIGHTLY
Qty: 90 TABLET | Refills: 3 | Status: SHIPPED | OUTPATIENT
Start: 2025-08-01

## 2025-08-05 PROBLEM — K21.9 GASTROESOPHAGEAL REFLUX DISEASE: Status: ACTIVE | Noted: 2025-08-05

## 2025-08-12 ENCOUNTER — HOSPITAL ENCOUNTER (OUTPATIENT)
Dept: ULTRASOUND IMAGING | Age: 55
Discharge: HOME | End: 2025-08-12
Attending: STUDENT IN AN ORGANIZED HEALTH CARE EDUCATION/TRAINING PROGRAM

## 2025-08-12 DIAGNOSIS — R79.89 ELEVATED LFTS: ICD-10-CM

## 2025-08-12 PROCEDURE — 76981 USE PARENCHYMA: CPT | Performed by: STUDENT IN AN ORGANIZED HEALTH CARE EDUCATION/TRAINING PROGRAM

## 2025-08-12 PROCEDURE — 76705 ECHO EXAM OF ABDOMEN: CPT | Performed by: STUDENT IN AN ORGANIZED HEALTH CARE EDUCATION/TRAINING PROGRAM

## 2025-08-20 ENCOUNTER — LAB REQUISITION (OUTPATIENT)
Dept: LAB | Facility: HOSPITAL | Age: 55
End: 2025-08-20

## 2025-08-20 DIAGNOSIS — K21.9 GASTRO-ESOPHAGEAL REFLUX DISEASE WITHOUT ESOPHAGITIS: ICD-10-CM

## (undated) DEVICE — Device

## (undated) DEVICE — ELECTRODE DFBR UNV 15.2X10.8CM ADH PAD RDTRNS POUCH PADPRO

## (undated) DEVICE — GUIDEWIRE OMNIWIRE 185CM STRGT VASC NTNL PRSS

## (undated) DEVICE — HEMOSTAT CMPR VASC REG 24CM

## (undated) DEVICE — GUIDEWIRE GUIDERIGHT 5CM 150CM 3MM RDS STD J CRV .035IN VASC

## (undated) DEVICE — CUP MED PLASTIC GRAD 2OZ STRL LF DISP

## (undated) DEVICE — DEVICE GTWY ENCORE ADV 20ML KIT TRQ GW INTRO INFL 20MM 3MM

## (undated) DEVICE — LINE MONITOR HP CONTRAST LF

## (undated) DEVICE — DRAPE L4 APRTR BRR PRTC 42X29IN 4.5X3.5IN SURG ACTI-GARD

## (undated) DEVICE — SYRINGE 10ML GRAD N-PYRG DEHP-FR PVC FREE STRL MED LF DISP

## (undated) DEVICE — CATHETER 6FR FL3.5 CRV 100CM FULL LGTH WIRE BRAID ROBUST

## (undated) DEVICE — CATHETER 6FR FR4 CRV 100CM FULL LGTH WIRE BRAID ROBUST SHAFT

## (undated) DEVICE — SYRINGE ANGIO LF MARK 7 ARTERION

## (undated) DEVICE — GUIDEWIRE STRT 10CM 260CM J CRV TPR .035IN VASC PTFE PERIPH

## (undated) DEVICE — CANNULA NSL ADPR SET NOMOLINE

## (undated) DEVICE — DRESSING TRANS 4.75X4IN ADH HPOAL WTPRF TEGADERM PU STD STRL

## (undated) DEVICE — SHIELD RAD RADPAD RAD PRTC STRL FEM ENTRY ANGIO

## (undated) DEVICE — SET XTN 6ML 41IN 7IN 32IN LONG 2 NDL FREE VLV FX MALE LL STD

## (undated) DEVICE — NEEDLE HPO 18GA 1.5IN REG WALL REG BVL LL HUB CLR CD DEHP-FR

## (undated) DEVICE — GLOVE SURG 7.5 PROTEXIS LF CRM PF SMTH BEAD CUFF STRL

## (undated) DEVICE — KIT INTRO 6FR 21GA 10CM 45CM .021IN 35MM FLEX STRGT SHTH DIL

## (undated) DEVICE — VALVE GUARDIAN 2 VSI HEMOSTASIS 8FR GW INS TOOL ROTOLOCK

## (undated) DEVICE — CATHETER 6FR PGTL CRV 110CM WIRE BRAID ROBUST SHAFT EXPO

## (undated) DEVICE — SET XTN 3ML 21IN FEMALE LL DIST CNCT STD BORE DEHP-FR LF

## (undated) DEVICE — SYRINGE 3ML GRAD N-PYRG DEHP-FR PVC FREE STRL MED LF DISP LL

## (undated) DEVICE — SYRINGE 6ML STD LL TIP GRAD STRL MED LF DISP MNJCT PP

## (undated) DEVICE — GUIDEWIRE GLDWR 3CM 180CM ANG STD .035IN VASC NTNL

## (undated) NOTE — LETTER
Date: 1/10/2025    Patient Name: Juan Jose Hinojosa          To Whom it may concern:    This letter has been written at the patient's request. The above patient was seen at [facility] for treatment of a medical condition.    This patient should be excused from attending work 1/10/25-1/11/25        Sincerely,    Soraya Mendoza PA-C